# Patient Record
Sex: MALE | Race: WHITE | NOT HISPANIC OR LATINO
[De-identification: names, ages, dates, MRNs, and addresses within clinical notes are randomized per-mention and may not be internally consistent; named-entity substitution may affect disease eponyms.]

---

## 2020-11-12 ENCOUNTER — TRANSCRIPTION ENCOUNTER (OUTPATIENT)
Age: 20
End: 2020-11-12

## 2020-12-09 ENCOUNTER — NON-APPOINTMENT (OUTPATIENT)
Age: 20
End: 2020-12-09

## 2020-12-09 PROBLEM — Z00.00 ENCOUNTER FOR PREVENTIVE HEALTH EXAMINATION: Status: ACTIVE | Noted: 2020-12-09

## 2021-01-21 ENCOUNTER — APPOINTMENT (OUTPATIENT)
Dept: ENDOCRINOLOGY | Facility: CLINIC | Age: 21
End: 2021-01-21

## 2021-02-25 ENCOUNTER — APPOINTMENT (OUTPATIENT)
Age: 21
End: 2021-02-25
Payer: COMMERCIAL

## 2021-02-25 DIAGNOSIS — F19.20 OTHER PSYCHOACTIVE SUBSTANCE DEPENDENCE, UNCOMPLICATED: ICD-10-CM

## 2021-02-25 DIAGNOSIS — F39 UNSPECIFIED MOOD [AFFECTIVE] DISORDER: ICD-10-CM

## 2021-02-25 DIAGNOSIS — F12.11 CANNABIS ABUSE, IN REMISSION: ICD-10-CM

## 2021-02-25 DIAGNOSIS — F19.10 OTHER PSYCHOACTIVE SUBSTANCE ABUSE, UNCOMPLICATED: ICD-10-CM

## 2021-02-25 PROCEDURE — 99205 OFFICE O/P NEW HI 60 MIN: CPT | Mod: 95

## 2021-10-04 ENCOUNTER — APPOINTMENT (OUTPATIENT)
Dept: ENDOCRINOLOGY | Facility: CLINIC | Age: 21
End: 2021-10-04
Payer: COMMERCIAL

## 2021-10-04 ENCOUNTER — NON-APPOINTMENT (OUTPATIENT)
Age: 21
End: 2021-10-04

## 2021-10-04 VITALS
SYSTOLIC BLOOD PRESSURE: 135 MMHG | BODY MASS INDEX: 15.54 KG/M2 | TEMPERATURE: 97.2 F | OXYGEN SATURATION: 98 % | WEIGHT: 111 LBS | DIASTOLIC BLOOD PRESSURE: 78 MMHG | HEIGHT: 71 IN | HEART RATE: 78 BPM

## 2021-10-04 DIAGNOSIS — F12.90 CANNABIS USE, UNSPECIFIED, UNCOMPLICATED: ICD-10-CM

## 2021-10-04 DIAGNOSIS — E28.39 OTHER PRIMARY OVARIAN FAILURE: ICD-10-CM

## 2021-10-04 DIAGNOSIS — Z78.9 OTHER SPECIFIED HEALTH STATUS: ICD-10-CM

## 2021-10-04 DIAGNOSIS — R63.6 UNDERWEIGHT: ICD-10-CM

## 2021-10-04 DIAGNOSIS — Z02.82 ENCOUNTER FOR ADOPTION SERVICES: ICD-10-CM

## 2021-10-04 PROCEDURE — 99204 OFFICE O/P NEW MOD 45 MIN: CPT

## 2021-10-04 NOTE — PHYSICAL EXAM
[Alert] : alert [Well Nourished] : well nourished [No Acute Distress] : no acute distress [EOMI] : extra ocular movement intact [Normal Hearing] : hearing was normal [No Respiratory Distress] : no respiratory distress [No Accessory Muscle Use] : no accessory muscle use [Clear to Auscultation] : lungs were clear to auscultation bilaterally [Normal S1, S2] : normal S1 and S2 [Normal Rate] : heart rate was normal [Normal Gait] : normal gait [Normal Strength/Tone] : muscle strength and tone were normal [Normal Affect] : the affect was normal [Normal Insight/Judgement] : insight and judgment were intact [Normal Mood] : the mood was normal

## 2021-10-05 LAB
ALBUMIN SERPL ELPH-MCNC: 4.9 G/DL
ALP BLD-CCNC: 63 U/L
ALT SERPL-CCNC: 14 U/L
ANION GAP SERPL CALC-SCNC: 15 MMOL/L
AST SERPL-CCNC: 20 U/L
BASOPHILS # BLD AUTO: 0.05 K/UL
BASOPHILS NFR BLD AUTO: 1.1 %
BILIRUB SERPL-MCNC: 0.8 MG/DL
BUN SERPL-MCNC: 12 MG/DL
CALCIUM SERPL-MCNC: 9.3 MG/DL
CHLORIDE SERPL-SCNC: 104 MMOL/L
CHOLEST SERPL-MCNC: 164 MG/DL
CO2 SERPL-SCNC: 24 MMOL/L
CREAT SERPL-MCNC: 0.93 MG/DL
EOSINOPHIL # BLD AUTO: 0.19 K/UL
EOSINOPHIL NFR BLD AUTO: 4.3 %
ESTRADIOL SERPL-MCNC: 27 PG/ML
FSH SERPL-MCNC: 2.8 IU/L
GLUCOSE SERPL-MCNC: 88 MG/DL
HCT VFR BLD CALC: 48 %
HDLC SERPL-MCNC: 44 MG/DL
HGB BLD-MCNC: 16.1 G/DL
HIV1+2 AB SPEC QL IA.RAPID: NONREACTIVE
IMM GRANULOCYTES NFR BLD AUTO: 0.2 %
LDLC SERPL CALC-MCNC: 106 MG/DL
LYMPHOCYTES # BLD AUTO: 1.18 K/UL
LYMPHOCYTES NFR BLD AUTO: 26.6 %
MAN DIFF?: NORMAL
MCHC RBC-ENTMCNC: 30.3 PG
MCHC RBC-ENTMCNC: 33.5 GM/DL
MCV RBC AUTO: 90.4 FL
MONOCYTES # BLD AUTO: 0.69 K/UL
MONOCYTES NFR BLD AUTO: 15.5 %
NEUTROPHILS # BLD AUTO: 2.32 K/UL
NEUTROPHILS NFR BLD AUTO: 52.3 %
NONHDLC SERPL-MCNC: 120 MG/DL
PLATELET # BLD AUTO: 203 K/UL
POTASSIUM SERPL-SCNC: 3.9 MMOL/L
PROLACTIN SERPL-MCNC: 8.3 NG/ML
PROT SERPL-MCNC: 7.1 G/DL
RBC # BLD: 5.31 M/UL
RBC # FLD: 12.8 %
SODIUM SERPL-SCNC: 142 MMOL/L
TRIGL SERPL-MCNC: 70 MG/DL
TSH SERPL-ACNC: 2.15 UIU/ML
WBC # FLD AUTO: 4.44 K/UL

## 2021-10-07 ENCOUNTER — NON-APPOINTMENT (OUTPATIENT)
Age: 21
End: 2021-10-07

## 2021-10-07 LAB
TESTOST BND SERPL-MCNC: 16.4 PG/ML
TESTOSTERONE TOTAL S: 812 NG/DL

## 2021-11-08 ENCOUNTER — APPOINTMENT (OUTPATIENT)
Dept: ENDOCRINOLOGY | Facility: CLINIC | Age: 21
End: 2021-11-08
Payer: COMMERCIAL

## 2021-11-08 VITALS
SYSTOLIC BLOOD PRESSURE: 120 MMHG | TEMPERATURE: 96.7 F | WEIGHT: 114 LBS | DIASTOLIC BLOOD PRESSURE: 74 MMHG | BODY MASS INDEX: 15.96 KG/M2 | HEIGHT: 71 IN | HEART RATE: 87 BPM

## 2021-11-08 PROBLEM — E28.39 FEMALE HYPOGONADISM: Status: ACTIVE | Noted: 2021-10-04

## 2021-11-08 PROBLEM — R63.6 UNDERWEIGHT: Status: ACTIVE | Noted: 2021-10-04

## 2021-11-08 PROCEDURE — 99214 OFFICE O/P EST MOD 30 MIN: CPT

## 2021-11-08 NOTE — PHYSICAL EXAM
[Alert] : alert [Well Nourished] : well nourished [No Acute Distress] : no acute distress [EOMI] : extra ocular movement intact [Normal Hearing] : hearing was normal [No Respiratory Distress] : no respiratory distress [No Accessory Muscle Use] : no accessory muscle use [Clear to Auscultation] : lungs were clear to auscultation bilaterally [Normal S1, S2] : normal S1 and S2 [Normal Rate] : heart rate was normal [Normal Bowel Sounds] : normal bowel sounds [Normal Gait] : normal gait [Normal Strength/Tone] : muscle strength and tone were normal [Normal Affect] : the affect was normal [Normal Insight/Judgement] : insight and judgment were intact [Normal Mood] : the mood was normal

## 2021-11-08 NOTE — ASSESSMENT
[FreeTextEntry1] : Cecilia is a 22 yo woman here for gender affirming hormone therapy\par \par 1. Gender dysphoria\par Patient is a  21  year old woman here to establish care for gender affirming hormone therapy. Discussed treatment with estradiol and spironolactone to attain testosterone level of < 50.  \par Discussed the risks of DVT and thromboembolism, dyslipidemia, liver enzyme elevation with estradiol.  Patient consents to treatment.  \par Will monitor BP and potassium levels while on spironolactone.  \par Will need to perform self-testicular exams and annual exam with risk screening for testicular cancer, prostate cancer.\par Possible plans for bottom/top surgery in the future\par Does not want to sperm bank at this time; no plans for biologic children\par Continue care with therapist\par Patient not sexually active but if she becomes sexually active or engages in sexual activity she will make me aware; consider PREP\par HIV screening done today\par Informed consent signed\par \par 2. BMI 15\par Patient is underweight, optimize nutrition\par Check TSH\par \par Follow up in 4 weeks

## 2021-11-08 NOTE — ASSESSMENT
[FreeTextEntry1] : Cecilia is a 22 yo woman here for gender affirming hormone therapy\par \par 1. Gender dysphoria\par Patient is a 21 year old woman here following up for gender affirming hormone therapy. Discussed treatment with estradiol and spironolactone to attain testosterone level of < 50. \par Discussed the risks of DVT and thromboembolism, dyslipidemia, liver enzyme elevation with estradiol. Patient consents to treatment. \par Will monitor BP and potassium levels while on spironolactone. Check BMP today \par Will need to perform self-testicular exams and annual exam with risk screening for testicular cancer, prostate cancer.\par Possible plans for bottom/top surgery in the future\par Does not want to sperm bank at this time; no plans for biologic children\par Continue care with therapist\par Patient not sexually active but if she becomes sexually active or engages in sexual activity she will make me aware; consider PREP\par HIV screening done at last visit\par Informed consent signed\par Check estradiol, testosterone, potassium levels. I anticipate increasing doses of medicines once results return\par \par Follow up in 3 months\par

## 2021-11-08 NOTE — REVIEW OF SYSTEMS
[Depression] : depression [Anxiety] : anxiety [Fatigue] : no fatigue [Decreased Appetite] : appetite not decreased [Dysphagia] : no dysphagia [Neck Pain] : no neck pain [Dysphonia] : no dysphonia [Nasal Congestion] : no nasal congestion [Chest Pain] : no chest pain [Palpitations] : no palpitations [Shortness Of Breath] : no shortness of breath [Nausea] : no nausea [Constipation] : no constipation [Vomiting] : no vomiting [Diarrhea] : no diarrhea [Headaches] : no headaches [Tremors] : no tremors [Polydipsia] : no polydipsia [Cold Intolerance] : no cold intolerance [de-identified] : social anxiety

## 2021-11-08 NOTE — HISTORY OF PRESENT ILLNESS
[FreeTextEntry1] : Mr. Lena SCHOENBERG is a 21 year here for the evaluation of transgender care\par \par Preferred Pronouns: she/her\par Gender Identity: female\par Sexual orientation: lesbian\par \par Childhood was weird, middle class family. Adopted at " 1 day old." During puberty, around age 13 had anger issues. States the anger was not from gender issues but felt like she had no one to talk to. Was never able to talk about emotions and therapist only prescribed emotions. States she always saw self as female but did not understand the concept of it. Started thinking about transitioning about 3 years ago, repressed it a little bit. After a while, they went on a self-discovery at age 18 years and started to "dig into myself." They were at a residential program for two years and upon returning last year. These were not conversion programs, no physical abuse. Came out February 2021. Presenting as woman-maybe wants breast augmentation in the future and facial feminization surgery. Two younger siblings in college. Parents are accepting, somewhat supportive. Does not have many friends, has more acquaintances. Has always been an introvert.\par \par Mental health: feels the best she has been; becoming more social\par No hospitalizations for self harm behavior\par Endocrine Transition History\par Patient sought hormones at age: never been on hormone therapy; wanted to be on hormones for about 1 year\par By starting HRT, looking forward to improved mental health and physical changes. Would like breast development and more feminine appearance\par \par Surgical Transition History\par Breast augmentation: would like at some point in the future along with FFS; possible bottom surgery\par Does not tuck\par \par Health Screening\par Cryopreservation: does not want biological children\par \par Marijuana use on occasion, no other recreational drugs\par Social alcohol\par Non smoker\par Diet: eats when hungry, does not eat meat-has chicken. Denies anorexia\par \par Cecilia was started on estradiol and spironolactone in October 2021.  She has not noticed any significant changes other than being a bit more hyperactive. Used to shave every two days, not she is shaving about every three days. Libido has decreased which she likes. States adherence to estradiol 2 mg and spironolactone 50 mg

## 2021-11-08 NOTE — HISTORY OF PRESENT ILLNESS
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  Adopted at " 1 day old."  During puberty, around age 13 had anger issues.  States the anger was not from gender issues but felt like she had no one to talk to.  Was never able to talk about emotions and therapist only prescribed emotions.  States she always saw self as female but did not understand the concept of it.  Started thinking about transitioning about 3 years ago, repressed it a little bit.  After a while, they went on a self-discovery at age 18 years and started to "dig into myself."  They were at a residential program for two years and upon returning last year.  These were not conversion programs, no physical abuse. Came out February 2021.  Presenting as woman-maybe wants breast augmentation in the future and facial feminization surgery.  Two younger siblings in college.  Parents are accepting, somewhat supportive.  Does not have many friends, has more acquaintances.  Has always been an introvert.\par \par Mental health: feels the best she has been; becoming more social\par No hospitalizations for self harm behavior\par Sexual activity: has not had sex in a while,\par Endocrine Transition History\par Patient sought hormones at age:  never been on hormone therapy; wanted to be on hormones for about 1 year\par By starting HRT, looking forward to improved mental health and physical changes.  Would like breast development and more feminine appearance\par \par Surgical Transition History\par Breast augmentation: would like at some point in the future along with FFS; possible bottom surgery\par Does not tuck\par \par Health Screening\par Cryopreservation: does not want biological children\par \par Marijuana use on occasion, no other recreational drugs\par Social alcohol\par Non smoker\par Diet: eats when hungry, does not eat meat-has chicken. Denies anorexia\par \par Was started on estradiol and spironolactone in October, has not noticed any changes other than being a bit more hyperactive. Used to shave every two days, not she is shaving about every three days.  Libido has decreased which she likes.

## 2021-11-09 LAB
ALBUMIN SERPL ELPH-MCNC: 4.7 G/DL
ALP BLD-CCNC: 64 U/L
ALT SERPL-CCNC: 12 U/L
ANION GAP SERPL CALC-SCNC: 15 MMOL/L
AST SERPL-CCNC: 16 U/L
BASOPHILS # BLD AUTO: 0.05 K/UL
BASOPHILS NFR BLD AUTO: 1.1 %
BILIRUB SERPL-MCNC: 0.5 MG/DL
BUN SERPL-MCNC: 15 MG/DL
CALCIUM SERPL-MCNC: 9 MG/DL
CHLORIDE SERPL-SCNC: 103 MMOL/L
CO2 SERPL-SCNC: 24 MMOL/L
CREAT SERPL-MCNC: 0.83 MG/DL
EOSINOPHIL # BLD AUTO: 0.15 K/UL
EOSINOPHIL NFR BLD AUTO: 3.3 %
ESTRADIOL SERPL-MCNC: 52 PG/ML
GLUCOSE SERPL-MCNC: 90 MG/DL
HCT VFR BLD CALC: 47.2 %
HGB BLD-MCNC: 15.9 G/DL
IMM GRANULOCYTES NFR BLD AUTO: 0.2 %
LYMPHOCYTES # BLD AUTO: 1.1 K/UL
LYMPHOCYTES NFR BLD AUTO: 24 %
MAN DIFF?: NORMAL
MCHC RBC-ENTMCNC: 30.3 PG
MCHC RBC-ENTMCNC: 33.7 GM/DL
MCV RBC AUTO: 90.1 FL
MONOCYTES # BLD AUTO: 0.62 K/UL
MONOCYTES NFR BLD AUTO: 13.5 %
NEUTROPHILS # BLD AUTO: 2.66 K/UL
NEUTROPHILS NFR BLD AUTO: 57.9 %
PLATELET # BLD AUTO: 210 K/UL
POTASSIUM SERPL-SCNC: 3.9 MMOL/L
PROT SERPL-MCNC: 6.8 G/DL
RBC # BLD: 5.24 M/UL
RBC # FLD: 12.4 %
SODIUM SERPL-SCNC: 142 MMOL/L
TESTOST SERPL-MCNC: 446 NG/DL
WBC # FLD AUTO: 4.59 K/UL

## 2022-02-07 ENCOUNTER — RX RENEWAL (OUTPATIENT)
Age: 22
End: 2022-02-07

## 2022-02-16 ENCOUNTER — APPOINTMENT (OUTPATIENT)
Dept: ENDOCRINOLOGY | Facility: CLINIC | Age: 22
End: 2022-02-16
Payer: COMMERCIAL

## 2022-02-16 VITALS
DIASTOLIC BLOOD PRESSURE: 79 MMHG | TEMPERATURE: 98.4 F | BODY MASS INDEX: 17.2 KG/M2 | WEIGHT: 113.5 LBS | OXYGEN SATURATION: 97 % | SYSTOLIC BLOOD PRESSURE: 116 MMHG | HEART RATE: 81 BPM | HEIGHT: 68 IN

## 2022-02-16 PROCEDURE — 99214 OFFICE O/P EST MOD 30 MIN: CPT

## 2022-02-16 NOTE — ASSESSMENT
[FreeTextEntry1] : Cecilia is a 20 yo woman here for gender affirming hormone therapy\par \par 1. Transgender woman\par Patient is a 21 year old woman here following up for gender affirming hormone therapy. Discussed treatment with estradiol and spironolactone to attain testosterone level of < 50 and estradiol levels within normative female ranges\par Discussed the risks of DVT and thromboembolism, dyslipidemia, liver enzyme elevation with estradiol. Patient consents to treatment. \par Will monitor BP and potassium levels while on spironolactone. \par Will need to perform self-testicular exams and annual exam with risk screening for testicular cancer, prostate cancer.\par Possible plans for bottom/top surgery in the future\par Does not want to sperm bank at this time; no plans for biologic children\par Continue care with therapist\par Patient not sexually active but if she becomes sexually active or engages in sexual activity she will make me aware; consider PREP\par HIV screening done at last visit\par Informed consent signed\par Wanted to obtain estradiol, testosterone, potassium levels today but patient had syncope from dehydration. She states this happens to her on occasion. She did not eat or drink anything today.  Ambulance was offered but she declines.  She is eating a protein bar and staying hydrated. Discussed the importance of staying hydrated as spironolactone has diuretic effects. Her BMI has improved, optimize nutrition. Labs slips were given to her, she can get blood work done another day.  \par

## 2022-02-16 NOTE — PHYSICAL EXAM
[Alert] : alert [Well Nourished] : well nourished [No Acute Distress] : no acute distress [EOMI] : extra ocular movement intact [Normal Hearing] : hearing was normal [Thyroid Not Enlarged] : the thyroid was not enlarged [No Respiratory Distress] : no respiratory distress [No Accessory Muscle Use] : no accessory muscle use [Clear to Auscultation] : lungs were clear to auscultation bilaterally [Normal S1, S2] : normal S1 and S2 [Normal Rate] : heart rate was normal [Normal Gait] : normal gait [No Rash] : no rash [No Motor Deficits] : the motor exam was normal [Normal Affect] : the affect was normal [Normal Insight/Judgement] : insight and judgment were intact [Normal Mood] : the mood was normal [de-identified] : patient is thin so thyroid is prominent, non tender ?possible right nodule

## 2022-02-16 NOTE — REVIEW OF SYSTEMS
[Fatigue] : no fatigue [Decreased Appetite] : appetite not decreased [Dysphagia] : no dysphagia [Dysphonia] : no dysphonia [Chest Pain] : no chest pain [Slow Heart Rate] : heart rate is not slow [Palpitations] : no palpitations [Fast Heart Rate] : heart rate is not fast [Shortness Of Breath] : no shortness of breath [Cough] : no cough [Nausea] : no nausea [Constipation] : no constipation [Vomiting] : no vomiting [Diarrhea] : no diarrhea [Headaches] : no headaches [Depression] : no depression [Cold Intolerance] : no cold intolerance [Heat Intolerance] : no heat intolerance

## 2022-02-16 NOTE — HISTORY OF PRESENT ILLNESS
[FreeTextEntry1] : Lena SCHOENBERG is a 21 year here for endocrine follow up of gender affirming hormone therapy\par \par Preferred Pronouns: she/her\par Gender Identity: female\par Sexual orientation: lesbian\par \par Childhood was weird, middle class family. Adopted at " 1 day old." During puberty, around age 13 had anger issues. States the anger was not from gender issues but felt like she had no one to talk to. Was never able to talk about emotions and therapist only prescribed medicines. States she always saw self as female but did not understand the concept of it. Started thinking about transitioning around 2019, repressed it a little bit. After a while, she went on self-discovery at age 18 years and started to "dig into myself."  Came out February 2021. Presenting as woman-maybe wants breast augmentation in the future and facial feminization surgery. Two younger siblings in college. Parents are accepting, somewhat supportive. Does not have many friends, has more acquaintances. Has always been an introvert.\par Cecilia established endocrine care here October 2021 at which point HRT was started.  \par Currently adheres to estradiol 2 mg BID and spironolactone 50 mg BID.  Feels well. There is development of breast tissue.  Denies any reported side effects.  \par \par Mental health: feels the best she has been; becoming more social\par No hospitalizations for self harm behavior\par Endocrine Transition History\par \par Surgical Transition History\par Breast augmentation: would like at some point in the future along with FFS; possible bottom surgery\par Does not tuck\par \par Health Screening\par Cryopreservation: does not want biological children\par \par Marijuana use on occasion, no other recreational drugs\par Social alcohol\par Non smoker\par Diet: eats when hungry, does not eat meat-has chicken. Denies anorexia\par \par Patient had routine annual exam with PCP and was told she had an enlarged thyroid. She is without compressive symptoms and without clinical symptoms of hypo/hyperthyroidism

## 2022-10-24 ENCOUNTER — APPOINTMENT (OUTPATIENT)
Dept: ENDOCRINOLOGY | Facility: CLINIC | Age: 22
End: 2022-10-24

## 2022-10-24 VITALS
WEIGHT: 118 LBS | OXYGEN SATURATION: 96 % | DIASTOLIC BLOOD PRESSURE: 85 MMHG | SYSTOLIC BLOOD PRESSURE: 122 MMHG | HEART RATE: 87 BPM | BODY MASS INDEX: 17.88 KG/M2 | TEMPERATURE: 97.8 F | HEIGHT: 68 IN

## 2022-10-24 DIAGNOSIS — E04.9 NONTOXIC GOITER, UNSPECIFIED: ICD-10-CM

## 2022-10-24 PROCEDURE — 99214 OFFICE O/P EST MOD 30 MIN: CPT

## 2022-10-24 NOTE — REVIEW OF SYSTEMS
[Fatigue] : no fatigue [Decreased Appetite] : appetite not decreased [Chest Pain] : no chest pain [Palpitations] : no palpitations [Shortness Of Breath] : no shortness of breath [Nausea] : no nausea [Vomiting] : no vomiting [Headaches] : no headaches [Tremors] : no tremors [Depression] : no depression

## 2022-10-24 NOTE — ASSESSMENT
[FreeTextEntry1] : Martine is a 23 yo woman here for follow up\par \par 1. Gender affirming hormone therapy\par Discussed treatment with estradiol and spironolactone to attain testosterone level of < 50 and estradiol levels within normative female ranges\par Discussed the risks of DVT and thromboembolism, dyslipidemia, liver enzyme elevation with estradiol. Patient consents to treatment. \par Will monitor BP and potassium levels while on spironolactone. Check today\par Will need to perform self-testicular exams and annual exam with risk screening for testicular cancer, prostate cancer.\par Possible plans for bottom/top surgery in the future\par Does not want to sperm bank at this time; no plans for biologic children\par Annual HIV screening today\par Sera feels well on current dose of estradiol and spironolactone. Check levels today\par \par 2. Thyroid\par Reports vague history of thyroid problems for which she saw a specialist who recommended OTC Thyroid Support. Discussed that thyroid levels have been normal in the past and that nutraceuticals are not indicated at this time. I will repeat TFT's today and if hypothyroid, start treatment with LT4\par -referral for thyroid US given today as well\par \par 5 month follow up\par

## 2022-10-24 NOTE — PHYSICAL EXAM
[Alert] : alert [Well Nourished] : well nourished [No Acute Distress] : no acute distress [EOMI] : extra ocular movement intact [Normal Hearing] : hearing was normal [Thyroid Not Enlarged] : the thyroid was not enlarged [No Respiratory Distress] : no respiratory distress [No Accessory Muscle Use] : no accessory muscle use [Clear to Auscultation] : lungs were clear to auscultation bilaterally [Normal S1, S2] : normal S1 and S2 [Normal Rate] : heart rate was normal [Normal Gait] : normal gait [No Rash] : no rash [No Motor Deficits] : the motor exam was normal [Normal Affect] : the affect was normal [Normal Insight/Judgement] : insight and judgment were intact [Normal Mood] : the mood was normal [de-identified] : patient is thin so thyroid is prominent, non tender

## 2022-10-24 NOTE — HISTORY OF PRESENT ILLNESS
[FreeTextEntry1] : Sera is a 22 year here for endocrine follow up of gender affirming hormone therapy\par \par Preferred Pronouns: she/her\par Gender Identity: female\par Sexual orientation: lesbian\par \par Childhood was weird, middle class family. Adopted at " 1 day old." During puberty, around age 13 had anger issues. States the anger was not from gender issues but felt like she had no one to talk to. Was never able to talk about emotions and therapist only prescribed medicines. States she always saw self as female but did not understand the concept of it. Started thinking about transitioning around 2019, repressed it a little bit. After a while, she went on self-discovery at age 18 years and started to "dig into myself." Came out February 2021. \par Sera established endocrine care here October 2021 at which point HRT was started. \par Currently adheres to estradiol 2 mg BID and spironolactone 50 mg BID. Feels well. There is development of breast tissue. Denies any reported side effects. Feels well. No syncope, no passing out.\par Mental health: feels the best she has been; becoming more social\par No hospitalizations for self harm behavior\par Surgical Transition History\par Breast augmentation: would like at some point in the future along with FFS; possible bottom surgery\par Does not tuck\par \par Health Screening\par Cryopreservation: does not want biological children\par \par Marijuana use on occasion, no other recreational drugs\par Social alcohol\par Non smoker\par Diet: eats when hungry, does not eat meat-has chicken. Denies anorexia\par  \par Patient reported constipation and felt it was a thyroid.  She decided to go to some sort of thyroid doctor.  That doctor did no prescribe medicines but told Sera to take something over the counter.  Referral for thyroid US was given in February but she didn't get it done.  Takes thyroid support

## 2022-10-25 LAB
ANION GAP SERPL CALC-SCNC: 16 MMOL/L
BUN SERPL-MCNC: 13 MG/DL
CALCIUM SERPL-MCNC: 9.4 MG/DL
CHLORIDE SERPL-SCNC: 97 MMOL/L
CO2 SERPL-SCNC: 23 MMOL/L
CREAT SERPL-MCNC: 0.86 MG/DL
EGFR: 126 ML/MIN/1.73M2
ESTRADIOL SERPL-MCNC: 80 PG/ML
GLUCOSE SERPL-MCNC: 89 MG/DL
HBA1C MFR BLD HPLC: 5
HIV1+2 AB SPEC QL IA.RAPID: NONREACTIVE
POTASSIUM SERPL-SCNC: 4.1 MMOL/L
SODIUM SERPL-SCNC: 136 MMOL/L
T4 FREE SERPL-MCNC: 1.3 NG/DL
TESTOST SERPL-MCNC: 196 NG/DL
THYROGLOB AB SERPL-ACNC: <20 IU/ML
THYROPEROXIDASE AB SERPL IA-ACNC: <10 IU/ML
TSH SERPL-ACNC: 2.03 UIU/ML

## 2023-03-20 ENCOUNTER — APPOINTMENT (OUTPATIENT)
Dept: ENDOCRINOLOGY | Facility: CLINIC | Age: 23
End: 2023-03-20
Payer: COMMERCIAL

## 2023-03-20 VITALS
BODY MASS INDEX: 18.19 KG/M2 | TEMPERATURE: 97.5 F | WEIGHT: 120 LBS | OXYGEN SATURATION: 96 % | SYSTOLIC BLOOD PRESSURE: 122 MMHG | DIASTOLIC BLOOD PRESSURE: 80 MMHG | HEART RATE: 90 BPM | HEIGHT: 68 IN

## 2023-03-20 DIAGNOSIS — Z78.9 OTHER SPECIFIED HEALTH STATUS: ICD-10-CM

## 2023-03-20 PROCEDURE — 99214 OFFICE O/P EST MOD 30 MIN: CPT

## 2023-03-20 NOTE — PHYSICAL EXAM
[Alert] : alert [Well Nourished] : well nourished [No Acute Distress] : no acute distress [EOMI] : extra ocular movement intact [Normal Hearing] : hearing was normal [Thyroid Not Enlarged] : the thyroid was not enlarged [No Respiratory Distress] : no respiratory distress [No Accessory Muscle Use] : no accessory muscle use [Clear to Auscultation] : lungs were clear to auscultation bilaterally [Normal S1, S2] : normal S1 and S2 [Normal Rate] : heart rate was normal [Normal Gait] : normal gait [No Rash] : no rash [No Motor Deficits] : the motor exam was normal [Normal Affect] : the affect was normal [Normal Insight/Judgement] : insight and judgment were intact [Normal Mood] : the mood was normal [de-identified] : BMI 18 [de-identified] : patient is thin so thyroid is prominent, non tender

## 2023-03-20 NOTE — ASSESSMENT
[FreeTextEntry1] : Sera is a 23 yo woman here for follow up\par \par 1. Gender affirming hormone therapy\par Discussed treatment with estradiol and spironolactone to attain testosterone level of < 50 and estradiol levels within normative female ranges\par Discussed the risks of DVT and thromboembolism, dyslipidemia, liver enzyme elevation with estradiol. Patient consents to treatment. \par Will need to perform self-testicular exams and annual exam with risk screening for testicular cancer, prostate cancer.\par Possible plans for bottom/top surgery in the future but not right now\par She adheres to estradiol 2 mg TID and spironolactone 50 mg BID.  Patient would like more breast growth and asks about progesterone. Discussed that progesterone has not been associated with with significant breast growth and that risks can outweigh benefits.  [place] and would benefit establishing endocrine care close to where she is with  more expertise in treatment. Patient can seek care with Dr. Ocasio.\par She also has a new girlfriend and requests a full panel of STD testing. Today, I will check HIV.  Patient requires transgender knowledgeable PCP. I will reach out to staff to obtain some names/referrals for her\par Does not want to sperm bank at this time; no plans for biologic children\par Sera feels well on current dose of estradiol and spironolactone but desire enhanced breast growth.\par \par Patient to transition care to transgender center [place]

## 2023-03-20 NOTE — HISTORY OF PRESENT ILLNESS
[FreeTextEntry1] : Sera is a 23 yo woman here for follow up\par \par Preferred Pronouns: she/her\par Gender Identity: female\par Sexual orientation: lesbian\par Childhood was weird, middle class family. Adopted at " 1 day old." During puberty, around age 13, she reports having had anger issues. States the anger was not from gender issues but felt like she had no one to talk to. Was never able to talk about emotions and therapist only prescribed medicines. States she always saw self as female but did not understand the concept of it. Started thinking about transitioning around 2019, repressed it a little bit. After a while, she went on self-discovery at age 18 years and started to "dig into myself." Came out February 2021. \par Sera established endocrine care here October 2021 at which point HRT was started. \par Currently adheres to estradiol 2 mg TID and spironolactone 50 mg BID. Feels well. There is development of breast tissue. Denies any reported side effects. Feels well. No syncope, no passing out. Patient states no significant difference.  \par Mental health: feels the best she has been; becoming more social\par No hospitalizations for self harm behavior\par Surgical Transition History\par Breast augmentation: would like at some point in the future along with FFS; possible bottom surgery\par Does not tuck\par \par Health Screening\par Cryopreservation: does not want biological children\par Marijuana use on occasion, no other recreational drugs\par Social alcohol\par Non smoker\par Diet: eats when hungry, does not eat meat-has chicken. Denies anorexia

## 2023-03-21 LAB
ALBUMIN SERPL ELPH-MCNC: 4.3 G/DL
ALP BLD-CCNC: 56 U/L
ALT SERPL-CCNC: 18 U/L
ANION GAP SERPL CALC-SCNC: 11 MMOL/L
AST SERPL-CCNC: 22 U/L
BASOPHILS # BLD AUTO: 0.04 K/UL
BASOPHILS NFR BLD AUTO: 0.6 %
BILIRUB SERPL-MCNC: 0.4 MG/DL
BUN SERPL-MCNC: 14 MG/DL
CALCIUM SERPL-MCNC: 9.4 MG/DL
CHLORIDE SERPL-SCNC: 103 MMOL/L
CHOLEST SERPL-MCNC: 182 MG/DL
CO2 SERPL-SCNC: 25 MMOL/L
CREAT SERPL-MCNC: 0.89 MG/DL
EGFR: 124 ML/MIN/1.73M2
EOSINOPHIL # BLD AUTO: 0.07 K/UL
EOSINOPHIL NFR BLD AUTO: 1 %
ESTRADIOL SERPL-MCNC: 112 PG/ML
GLUCOSE SERPL-MCNC: 81 MG/DL
HCT VFR BLD CALC: 45.2 %
HDLC SERPL-MCNC: 34 MG/DL
HGB BLD-MCNC: 15.3 G/DL
HIV1+2 AB SPEC QL IA.RAPID: NONREACTIVE
IMM GRANULOCYTES NFR BLD AUTO: 0.1 %
LDLC SERPL CALC-MCNC: 105 MG/DL
LYMPHOCYTES # BLD AUTO: 2.07 K/UL
LYMPHOCYTES NFR BLD AUTO: 30 %
MAN DIFF?: NORMAL
MCHC RBC-ENTMCNC: 31.4 PG
MCHC RBC-ENTMCNC: 33.8 GM/DL
MCV RBC AUTO: 92.6 FL
MONOCYTES # BLD AUTO: 0.94 K/UL
MONOCYTES NFR BLD AUTO: 13.6 %
NEUTROPHILS # BLD AUTO: 3.76 K/UL
NEUTROPHILS NFR BLD AUTO: 54.7 %
NONHDLC SERPL-MCNC: 148 MG/DL
PLATELET # BLD AUTO: 245 K/UL
POTASSIUM SERPL-SCNC: 4.5 MMOL/L
PROLACTIN SERPL-MCNC: 14.4 NG/ML
PROT SERPL-MCNC: 7.3 G/DL
RBC # BLD: 4.88 M/UL
RBC # FLD: 12.4 %
SODIUM SERPL-SCNC: 139 MMOL/L
TESTOST SERPL-MCNC: <2.5 NG/DL
TRIGL SERPL-MCNC: 215 MG/DL
WBC # FLD AUTO: 6.89 K/UL

## 2023-07-28 NOTE — REVIEW OF SYSTEMS
[Fatigue] : no fatigue [Decreased Appetite] : appetite not decreased [Dysphagia] : no dysphagia [Dysphonia] : no dysphonia [Chest Pain] : no chest pain [Palpitations] : no palpitations No Decelerations [Shortness Of Breath] : no shortness of breath [Nausea] : no nausea [Vomiting] : no vomiting [Depression] : depression [Suicidal Ideation] : no suicidal ideation [Cold Intolerance] : no cold intolerance [de-identified] : baseline mood

## 2023-08-01 ENCOUNTER — RX RENEWAL (OUTPATIENT)
Age: 23
End: 2023-08-01

## 2023-08-01 RX ORDER — SPIRONOLACTONE 50 MG/1
50 TABLET ORAL
Qty: 60 | Refills: 0 | Status: ACTIVE | COMMUNITY
Start: 2021-10-04 | End: 1900-01-01

## 2023-08-01 RX ORDER — ESTRADIOL 2 MG/1
2 TABLET ORAL
Qty: 90 | Refills: 0 | Status: ACTIVE | COMMUNITY
Start: 2021-10-04 | End: 1900-01-01